# Patient Record
(demographics unavailable — no encounter records)

---

## 2025-03-17 NOTE — BIRTH HISTORY
[At Term] : at term [place] [Normal Vaginal Route] : by normal vaginal route [Age Appropriate] : age appropriate developmental milestones met [de-identified] : Maternal Infection [FreeTextEntry4] : Brief NICU stay to monitor for infection but dishcarged with mother

## 2025-03-17 NOTE — HISTORY OF PRESENT ILLNESS
[FreeTextEntry1] : CAROLYNE is an 12 y/o male here for initial evaluation of inattention and behavior concerns. He is in 6th grade at the [employer] with no formal accommodations in place.   Carolyne's teachers have expressed concerns about his inability to focus in class. He has difficulty following directions and requires frequent redirection to pay attention. He is able to stay seated throughout the school day but is easily distracted. He has difficulty reading assigned passages and runs out of time on in-class assignments. His grades are declining, and he is hard on himself for not being able to focus. He often talks to his friends in the middle of class and forgets to hand in homework even when he's completed it. Mother endorses similar symptoms at home. He is easily distracted and unable to focus on one task at a time. Homework takes hours to complete, and he makes excuses as to why he needs to get up and do something else. He "spaces out" a lot and is often in his own world. He has difficulty regulating his emotions and becomes easily frustrated. He procrastinates when he has multiple homework assignments because it becomes overwhelming for him. He is very fidgety and has some tic-like behaviors of clearing his throat when he is uncomfortable or bored. Mother denies any aggressive behaviors. She has no social concerns.  Early development: CAROLYNE was born full term via NVD. He had a brief NICU stay due to maternal infection but was discharged home with mother. He hit all early developmental milestones appropriately.    Educational assessment: Current Grade: 6th Current District: [employer] ED/Any Accommodations/ICT in place: 35 children, 2 teachers   Home assessment: Takes hours to complete homework, unable to follow multistep directions, easily distracted.  Hyperactivity: Fidgety.   Social concerns: None, gets along with peers, parents.   Co-morbidities: Some concerns for anxiety but mostly related to school work.   Sleep: Sleeps well throughout the night but sometimes awakens in the middle of the night. Mother denies any snoring. Goes to bed at 10pm, wakes up at 6am.   FH: No significant FH, seizures or sudden cardiac death before the age of 40-50.   Other health concerns: Denies staring, twitching, seizure or seizure-like activity. No serious head injury, meningoencephalitis.

## 2025-03-17 NOTE — PLAN
[FreeTextEntry1] : - [place] questionnaires given to mother - Discussed use of Omega 3 fish oil - Briefly discussed use of medications and side effects if he meets criteria for ADHD diagnosis - Follow up in 1 month

## 2025-03-17 NOTE — ASSESSMENT
[FreeTextEntry1] : ZANE is an 10 y/o male presenting for behavioral and attention concerns. He does not have school-based accommodations. He is easily distracted and has difficulty following multistep directions. His grades are declining and he is easily frustrated. Will proceed with evaluation for ADHD.

## 2025-03-17 NOTE — QUALITY MEASURES
[Impairment in more than one setting] : Impairment in more than one setting: Yes [Coexisting conditions] : Coexisting conditions: Yes [Medication choices] : Medication choices: Not Applicable [Side effects of medications] : Side effects of medications: Not Applicable [FreeTextEntry1] : [place] forms given to mother

## 2025-03-17 NOTE — PHYSICAL EXAM
[Well-appearing] : well-appearing [Normocephalic] : normocephalic [No dysmorphic facial features] : no dysmorphic facial features [No ocular abnormalities] : no ocular abnormalities [Alert] : alert [Conversant] : conversant [Normal speech and language] : normal speech and language [Gets up on table without difficulty] : gets up on table without difficulty [Walks and runs well] : walks and runs well [Good walking balance] : good walking balance [Normal gait] : normal gait [3+] : Right Tonsil: 3+

## 2025-03-17 NOTE — CONSULT LETTER
[Dear  ___] : Dear  [unfilled], [Consult Letter:] : I had the pleasure of evaluating your patient, [unfilled]. [Please see my note below.] : Please see my note below. [Consult Closing:] : Thank you very much for allowing me to participate in the care of this patient.  If you have any questions, please do not hesitate to contact me. [Sincerely,] : Sincerely, [FreeTextEntry3] : VANDANA Varela-C Certified Family Nurse Practitioner Pediatric Neurology [facility]

## 2025-04-11 NOTE — ASSESSMENT
[FreeTextEntry1] : ZANE is an 10 y/o male presenting for a follow up evaluation for behavioral and attention concerns. 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 Results reviewed with mother.

## 2025-04-11 NOTE — DATA REVIEWED
[FreeTextEntry1] : [place] forms were completed:  Parents' responses:  Inattention 6/9- (6/9)  Hyperactivity 4/9 (6/9)  ODD: 3/8 (4/8)  Conduct disorder: 0/14 (3/14)  Anxiety/ Depression: 1/7 (3/7)    Teacher's responses:  Inattention 7/9- (6/9)  Hyperactivity 3/9 (6/9)  ODD/ Conduct: 0/10 (3/10)  Anxiety/ Depression: 1/7 (3/7)    Inattention 4/9- (6/9)  Hyperactivity 2/9 (6/9)  ODD/ Conduct: 0/10 (3/10)  Anxiety/ Depression: 0/7 (3/7)    Performance questions: Parents: Areas of concern include overall reading and writing. Teachers: Areas of concern include reading, and written expression. Following directions, disrupting class, assignment completion and organizational skills.

## 2025-04-11 NOTE — PLAN
[FreeTextEntry1] : - Letter written for school, stating diagnosis and requesting accommodations. - Continue with daily omega 3 supplements and nightly magnesium supplements. - Follow up PRN

## 2025-04-11 NOTE — CONSULT LETTER
[Dear  ___] : Dear  [unfilled], [Consult Letter:] : I had the pleasure of evaluating your patient, [unfilled]. [Please see my note below.] : Please see my note below. [Consult Closing:] : Thank you very much for allowing me to participate in the care of this patient.  If you have any questions, please do not hesitate to contact me. [Sincerely,] : Sincerely, [FreeTextEntry3] : VANDANA Varela-C Certified Family Nurse Practitioner Pediatric Neurology [facility]

## 2025-04-11 NOTE — HISTORY OF PRESENT ILLNESS
[FreeTextEntry1] : CAROLYNE is here for a follow-up visit for inattention concerns. [place] forms were completed and results reviewed with mother. 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  Parents' responses:  Inattention 6/9- (6/9)  Hyperactivity 4/9 (6/9)  ODD: 3/8 (4/8)  Conduct disorder: 0/14 (3/14)  Anxiety/ Depression: 1/7 (3/7)    Teacher's responses:  Inattention 7/9- (6/9)  Hyperactivity 3/9 (6/9)  ODD/ Conduct: 0/10 (3/10)  Anxiety/ Depression: 1/7 (3/7)    Inattention 4/9- (6/9)  Hyperactivity 2/9 (6/9)  ODD/ Conduct: 0/10 (3/10)  Anxiety/ Depression: 0/7 (3/7)    Performance questions: Parents: Areas of concern include overall reading and writing. Teachers: Areas of concern include reading, and written expression. Following directions, disrupting class, assignment completion and organizational skills.  ____________________________________________ Initial Visit 3/17/25 CAROLYNE is an 12 y/o male here for initial evaluation of inattention and behavior concerns. He is in 6th grade at the [employer] with no formal accommodations in place.   Carolyne's teachers have expressed concerns about his inability to focus in class. He has difficulty following directions and requires frequent redirection to pay attention. He is able to stay seated throughout the school day but is easily distracted. He has difficulty reading assigned passages and runs out of time on in-class assignments. His grades are declining, and he is hard on himself for not being able to focus. He often talks to his friends in the middle of class and forgets to hand in homework even when he's completed it. Mother endorses similar symptoms at home. He is easily distracted and unable to focus on one task at a time. Homework takes hours to complete, and he makes excuses as to why he needs to get up and do something else. He "spaces out" a lot and is often in his own world. He has difficulty regulating his emotions and becomes easily frustrated. He procrastinates when he has multiple homework assignments because it becomes overwhelming for him. He is very fidgety and has some tic-like behaviors of clearing his throat when he is uncomfortable or bored. Mother denies any aggressive behaviors. She has no social concerns.  Early development: CAROLYNE was born full term via NVD. He had a brief NICU stay due to maternal infection but was discharged home with mother. He hit all early developmental milestones appropriately.    Educational assessment: Current Grade: 6th Current District: [employer] ED/Any Accommodations/ICT in place: 35 children, 2 teachers   Home assessment: Takes hours to complete homework, unable to follow multistep directions, easily distracted.  Hyperactivity: Fidgety.   Social concerns: None, gets along with peers, parents.   Co-morbidities: Some concerns for anxiety but mostly related to school work.   Sleep: Sleeps well throughout the night but sometimes awakens in the middle of the night. Mother denies any snoring. Goes to bed at 10pm, wakes up at 6am.   FH: No significant FH, seizures or sudden cardiac death before the age of 40-50.   Other health concerns: Denies staring, twitching, seizure or seizure-like activity. No serious head injury, meningoencephalitis.

## 2025-04-11 NOTE — REASON FOR VISIT
[Home] : at home, [unfilled] , at the time of the visit. [Medical Office: ([facility])___] : at the medical office located in  [Telehealth (audio & video)] : This visit was provided via telehealth using real-time 2-way audio visual technology. [Verbal consent obtained from patient] : the patient, [unfilled] [Follow-Up Evaluation] : a follow-up evaluation for [ADHD] : ADHD [Patient] : patient [Mother] : mother [FreeTextEntry3] : Nora Curry, Mother

## 2025-04-11 NOTE — BIRTH HISTORY
[At Term] : at term [place] [Normal Vaginal Route] : by normal vaginal route [Age Appropriate] : age appropriate developmental milestones met [de-identified] : Maternal Infection [FreeTextEntry4] : Brief NICU stay to monitor for infection but dishcarged with mother